# Patient Record
Sex: MALE | Race: OTHER | NOT HISPANIC OR LATINO | URBAN - METROPOLITAN AREA
[De-identification: names, ages, dates, MRNs, and addresses within clinical notes are randomized per-mention and may not be internally consistent; named-entity substitution may affect disease eponyms.]

---

## 2017-11-21 ENCOUNTER — EMERGENCY (EMERGENCY)
Facility: HOSPITAL | Age: 52
LOS: 1 days | Discharge: HOME | End: 2017-11-21
Attending: EMERGENCY MEDICINE | Admitting: EMERGENCY MEDICINE
Payer: SELF-PAY

## 2017-11-21 VITALS
HEIGHT: 64 IN | SYSTOLIC BLOOD PRESSURE: 143 MMHG | RESPIRATION RATE: 18 BRPM | OXYGEN SATURATION: 98 % | HEART RATE: 64 BPM | TEMPERATURE: 98 F | DIASTOLIC BLOOD PRESSURE: 89 MMHG | WEIGHT: 164.91 LBS

## 2017-11-21 VITALS
DIASTOLIC BLOOD PRESSURE: 84 MMHG | RESPIRATION RATE: 16 BRPM | HEART RATE: 73 BPM | SYSTOLIC BLOOD PRESSURE: 123 MMHG | TEMPERATURE: 98 F | OXYGEN SATURATION: 98 %

## 2017-11-21 PROCEDURE — 99283 EMERGENCY DEPT VISIT LOW MDM: CPT | Mod: 25

## 2017-11-21 PROCEDURE — 99284 EMERGENCY DEPT VISIT MOD MDM: CPT

## 2017-11-21 PROCEDURE — 73562 X-RAY EXAM OF KNEE 3: CPT

## 2017-11-21 PROCEDURE — 73562 X-RAY EXAM OF KNEE 3: CPT | Mod: 26,RT

## 2017-11-21 RX ORDER — IBUPROFEN 200 MG
1 TABLET ORAL
Qty: 28 | Refills: 0 | OUTPATIENT
Start: 2017-11-21 | End: 2017-11-28

## 2017-11-21 RX ORDER — IBUPROFEN 200 MG
600 TABLET ORAL ONCE
Qty: 0 | Refills: 0 | Status: COMPLETED | OUTPATIENT
Start: 2017-11-21 | End: 2017-11-21

## 2017-11-21 RX ADMIN — Medication 600 MILLIGRAM(S): at 11:00

## 2017-11-21 RX ADMIN — Medication 600 MILLIGRAM(S): at 10:19

## 2017-11-21 NOTE — ED PROVIDER NOTE - LOWER EXTREMITY EXAM, RIGHT
tender along patella and medial joint line, no varus/valgus laxity, neg ant/post drawer test, no effusion/TENDERNESS

## 2017-11-21 NOTE — ED ADULT NURSE NOTE - OBJECTIVE STATEMENT
pt alert and awake x3, arrived to ED s/p slip fall from standing height, c//o right knee pain, pt refusing to move knee, able to move right foot, knee tender upon palpation, positive pulses, no obvious deformity, denies hitting head, denies LOC, denies blood thinners, denies chest pain/sob, will continue to monitor.

## 2017-11-21 NOTE — ED ADULT TRIAGE NOTE - CHIEF COMPLAINT QUOTE
BIBA, patient reports he tripped and fell landing on right knee. reports severe right knee pain. Denies LOC. no blood thinners. RLE immobilized by EMS.

## 2017-11-21 NOTE — ED PROVIDER NOTE - MEDICAL DECISION MAKING DETAILS
XR neg. pt neuropvascular intact. pain improved. pt well appearing, knee stable. Adviosed knee immobilizer, RICE, ortho f/u

## 2017-11-21 NOTE — ED PROVIDER NOTE - OBJECTIVE STATEMENT
51 year old male with no significant PMh presents with knee pain. Pt states that today at work he slipped on a wet spot and fell onto his right knee. Did not hit head, no LOC. He currently reports sharp pain at the anterior and medial; aspect of his right knee that is constant, no radiation, worse with movement. No associated numbness, tingling, weakness, fever, chills, hip/back/neck pain

## 2017-11-22 NOTE — ED POST DISCHARGE NOTE - ADDITIONAL DOCUMENTATION
Patient provided feedback, reports prescription was not at his pharmacy, confirmed pharmacy on record.

## 2019-12-17 NOTE — ED PROVIDER NOTE - CPE EDP [facility] NORM
How Severe Is This Condition?: mild Additional History: Bx.  Done 10/29/19 revealed mild ak left frontal temporal scalp - - -